# Patient Record
Sex: MALE | Race: WHITE
[De-identification: names, ages, dates, MRNs, and addresses within clinical notes are randomized per-mention and may not be internally consistent; named-entity substitution may affect disease eponyms.]

---

## 2017-11-10 ENCOUNTER — HOSPITAL ENCOUNTER (EMERGENCY)
Dept: HOSPITAL 17 - NEPE | Age: 52
LOS: 1 days | Discharge: HOME | End: 2017-11-11
Payer: OTHER GOVERNMENT

## 2017-11-10 VITALS
TEMPERATURE: 98.3 F | RESPIRATION RATE: 18 BRPM | HEART RATE: 139 BPM | SYSTOLIC BLOOD PRESSURE: 183 MMHG | OXYGEN SATURATION: 95 % | DIASTOLIC BLOOD PRESSURE: 101 MMHG

## 2017-11-10 DIAGNOSIS — I10: ICD-10-CM
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
## 2017-11-10 DIAGNOSIS — M54.2: ICD-10-CM

## 2017-11-10 DIAGNOSIS — Z72.0: ICD-10-CM

## 2017-11-10 DIAGNOSIS — R94.31: ICD-10-CM

## 2017-11-10 DIAGNOSIS — S09.90XA: Primary | ICD-10-CM

## 2017-11-10 PROCEDURE — 70450 CT HEAD/BRAIN W/O DYE: CPT

## 2017-11-10 PROCEDURE — 99285 EMERGENCY DEPT VISIT HI MDM: CPT

## 2017-11-10 PROCEDURE — 72125 CT NECK SPINE W/O DYE: CPT

## 2017-11-10 PROCEDURE — 93005 ELECTROCARDIOGRAM TRACING: CPT
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
## 2017-11-11 NOTE — EKG
Date Performed: 11/10/2017       Time Performed: 23:17:38

 

PTAGE:      52 years

 

EKG:      Sinus tachycardia Poor R-wave progression which may be of normal variant Small inferior Q w
aves of undetermined significance Compared to previous tracing, the small Q waves are new. ABNORMAL E
CG

 

PREVIOUS TRACING       : 04/27/2017 19.17

 

DOCTOR:   Gualberto Shi  Interpretating Date/Time  11/11/2017 12:16:50

## 2018-03-23 ENCOUNTER — HOSPITAL ENCOUNTER (EMERGENCY)
Dept: HOSPITAL 17 - NEPD | Age: 53
LOS: 1 days | Discharge: HOME | End: 2018-03-24
Payer: OTHER GOVERNMENT

## 2018-03-23 VITALS
HEART RATE: 124 BPM | OXYGEN SATURATION: 97 % | RESPIRATION RATE: 16 BRPM | DIASTOLIC BLOOD PRESSURE: 104 MMHG | SYSTOLIC BLOOD PRESSURE: 167 MMHG | TEMPERATURE: 99 F

## 2018-03-23 VITALS — BODY MASS INDEX: 29.41 KG/M2 | WEIGHT: 187.39 LBS | HEIGHT: 67 IN

## 2018-03-23 DIAGNOSIS — L02.11: Primary | ICD-10-CM

## 2018-03-23 DIAGNOSIS — I10: ICD-10-CM

## 2018-03-23 PROCEDURE — 99283 EMERGENCY DEPT VISIT LOW MDM: CPT

## 2018-03-24 NOTE — PD
HPI


Chief Complaint:  Skin Problem


Time Seen by Provider:  01:00


Travel History


International Travel<30 days:  No


Contact w/Intl Traveler<30days:  No


Traveled to known affect area:  No





History of Present Illness


HPI


52-year-old white male presents to emergency Department with complaints of a 

abscess to his right neck over the past week.  He states that he is unsure 

whether he may been bitten by something.  He's been picking and squeezing the 

area and had gotten some pus out.  He states that the area has once again 

became tender and red.  He is requesting antibiotics.  Symptoms are mild to 

moderate.  No exacerbating or alleviating activity.





PFSH


Past Medical History


Medical History:  Denies Significant Hx


Diminished Hearing:  No


Hypertension:  Yes


Immunizations Current:  Yes


Tetanus Vaccination:  < 5 Years


Influenza Vaccination:  Yes





Past Surgical History


Surgical History:  No Previous Surgery





Social History


Alcohol Use:  Yes (rarely)


Tobacco Use:  No


Substance Use:  Yes (marajuiana)





Allergies-Medications


(Allergen,Severity, Reaction):  


Coded Allergies:  


     No Known Allergies (Unverified  Adverse Reaction, Unknown, 3/23/18)


Reported Meds & Prescriptions





Reported Meds & Active Scripts


Active


Bactrim DS (Sulfamethoxazole-Trimethoprim) 800-160 Mg Tab 1 Tab PO BID








Review of Systems


Except as stated in HPI:  all other systems reviewed are Neg





Physical Exam


Narrative


GENERAL: This is a well-nourished, well-developed patient, in no apparent 

distress.


SKIN: Patient has an open draining abscess to the right side of the neck.  This 

measures 1.5 x 1.5 cm.  No fluctuance or pointing.  Positive induration.


HEAD: Atraumatic. Normocephalic.


EYES: PERRL, EOMI, no discharge or injection. No scleral icterus.


EARS: Clear


NOSE: Nasal turbinates appear normal.


THROAT: Mucosa pink and moist.  Airway patent.


NECK: Trachea midline. supple, moves head freely.


LUNGS: Clear to auscultation.


CV: Regular in rhythm.


ABDOMEN: Soft nontender.


EXT: No clubbing cyanosis or edema.





Data


Data


Last Documented VS





Vital Signs








  Date Time  Temp Pulse Resp B/P (MAP) Pulse Ox O2 Delivery O2 Flow Rate FiO2


 


3/23/18 22:42 99.0 124 16 167/104 (125) 97 Room Air  








Orders





 Orders


Ed Discharge Order (3/24/18 01:08)


Sulfamet-Trimeth Ds 800-160 Mg (Bactrim (3/24/18 01:15)








MDM


Medical Decision Making


Medical Screen Exam Complete:  Yes


Emergency Medical Condition:  Yes


Medical Record Reviewed:  Yes


Differential Diagnosis


MDM: High


Differential diagnoses: Abscess, folliculitis, cellulitis, lymphangitis, 

abrasion, contact dermatitis


Narrative Course


Patient is given Bactrim DS by mouth here in the ER.





This is right neck abscess





Diagnosis





 Primary Impression:  


 right neck abscess


Patient Instructions:  General Instructions





***Additional Instructions:  


Rest.


Elevation.


keep clean and dry.


Warm compresses


Daily wound care with soap, water and Neosporin.


Three Advil every 6 hours.


Bactrim DS.


Follow-up with a primary care doctor in one week.


Return to the ER for any problems.


***Med/Other Pt SpecificInfo:  Prescription(s) given, Wound Care


Scripts


Sulfamethoxazole-Trimethoprim (Bactrim DS) 800-160 Mg Tab


1 TAB PO BID for Infection, #20 TAB 0 Refills


   Prov: Stefania Oates MD         3/24/18


Disposition:  01 DISCHARGE HOME


Condition:  Stable











Enzo Fernandez Mar 24, 2018 01:15

## 2018-04-20 ENCOUNTER — HOSPITAL ENCOUNTER (EMERGENCY)
Dept: HOSPITAL 17 - NEPC | Age: 53
LOS: 1 days | Discharge: HOME | End: 2018-04-21
Payer: OTHER GOVERNMENT

## 2018-04-20 VITALS
DIASTOLIC BLOOD PRESSURE: 106 MMHG | HEART RATE: 124 BPM | SYSTOLIC BLOOD PRESSURE: 171 MMHG | RESPIRATION RATE: 16 BRPM | OXYGEN SATURATION: 99 % | TEMPERATURE: 98.7 F

## 2018-04-20 VITALS — HEIGHT: 67 IN | BODY MASS INDEX: 27.68 KG/M2 | WEIGHT: 176.37 LBS

## 2018-04-20 DIAGNOSIS — I10: ICD-10-CM

## 2018-04-20 DIAGNOSIS — L03.213: Primary | ICD-10-CM

## 2018-04-20 PROCEDURE — 99284 EMERGENCY DEPT VISIT MOD MDM: CPT

## 2018-04-20 PROCEDURE — 96374 THER/PROPH/DIAG INJ IV PUSH: CPT

## 2018-04-21 NOTE — PD
HPI


Chief Complaint:  Skin Problem


Time Seen by Provider:  02:19


Travel History


International Travel<30 days:  No


Contact w/Intl Traveler<30days:  No


Traveled to known affect area:  No





History of Present Illness


HPI


52-year-old male complains of swelling left eye.  Patient states that has a 

small pimple on the lateral aspect the left eye yesterday.  Patient states he 

has increasing redness swelling on the left eye since then.  Patient denies any 

fever chills.  Patient denies any visual change.  Patient denies any injury to 

her left eye.  Patient states that he is up-to-date with TD booster.





PFSH


Past Medical History


Diminished Hearing:  No


Hypertension:  Yes


Immunizations Current:  Yes





Past Surgical History


Surgical History:  No Previous Surgery





Social History


Alcohol Use:  Yes (rarely)


Tobacco Use:  No


Substance Use:  Yes (marajuiana)





Allergies-Medications


(Allergen,Severity, Reaction):  


Coded Allergies:  


     No Known Allergies (Unverified  Adverse Reaction, Unknown, 4/20/18)


Reported Meds & Prescriptions





Reported Meds & Active Scripts


Active


Bactrim DS (Sulfamethoxazole-Trimethoprim) 800-160 Mg Tab 1 Tab PO BID








Review of Systems


General / Constitutional:  No: Fever


Eyes:  No: Visual changes


HENT:  No: Headaches


Cardiovascular:  No: Chest Pain or Discomfort


Respiratory:  No: Shortness of Breath


Gastrointestinal:  No: Abdominal Pain


Genitourinary:  No: Dysuria


Musculoskeletal:  No: Pain


Skin:  No Rash


Neurologic:  No: Weakness


Psychiatric:  No: Depression


Endocrine:  No: Polydipsia


Hematologic/Lymphatic:  No: Easy Bruising





Physical Exam


Narrative


GENERAL: Well-nourished, well-developed patient.


SKIN: Focused skin assessment warm/dry.


HEAD: Normocephalic.


EYES: No scleral icterus. No injection or drainage. 


NECK: Supple, trachea midline. No JVD or lymphadenopathy.


CARDIOVASCULAR: Regular rate and rhythm without murmurs, gallops, or rubs. 


RESPIRATORY: Breath sounds equal bilaterally. No accessory muscle use.


GASTROINTESTINAL: Abdomen soft, non-tender, nondistended. 


MUSCULOSKELETAL: No cyanosis, or edema. 


BACK: Nontender without obvious deformity. No CVA tenderness.


Patient has an area of redness swelling with mild tenderness periorbital area 

left eye especially lateral aspect the left eye.  No induration and no 

discharge noted.  Extraocular muscle intact.





Data


Data


Last Documented VS





Vital Signs








  Date Time  Temp Pulse Resp B/P (MAP) Pulse Ox O2 Delivery O2 Flow Rate FiO2


 


4/20/18 21:36 98.7 124 16 171/106 (127) 99   








Orders





 Orders


Vancomycin Inj (Vancomycin Inj) (4/21/18 02:30)








[facility]


Medical Decision Making


Medical Screen Exam Complete:  Yes


Emergency Medical Condition:  Yes


Differential Diagnosis


Differential diagnosis includes periorbital cellulitis, abscess.


Narrative Course


52-year-old male with redness swelling around the left eye.  The symptoms 

started yesterday.  Examination consistent with preseptal cellulitis.  

Vancomycin 1 g IV given.





Diagnosis





 Primary Impression:  


 Periorbital cellulitis


 Qualified Codes:  L03.213 - Periorbital cellulitis


Patient Instructions:  General Instructions





***Additional Instructions:  


Clindamycin and Bactrim DS as directed.  Tomorrow for recheck.  Advised warm 

moist compress.


***Med/Other Pt SpecificInfo:  Prescription(s) given


Scripts


Sulfamethoxazole-Trimethoprim (Bactrim DS) 800-160 Mg Tab


1 TAB PO BID for Infection, #20 TAB 0 Refills


   Prov: Js Pena MD         4/21/18 


Clindamycin (Clindamycin) 150 Mg Cap


300 MG PO QID for Infection, #80 CAP 0 Refills


   Prov: Js Pena MD         4/21/18


Disposition:  01 DISCHARGE HOME


Condition:  Stable











Js Pena MD Apr 21, 2018 02:29
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
